# Patient Record
Sex: FEMALE | Race: BLACK OR AFRICAN AMERICAN | NOT HISPANIC OR LATINO
[De-identification: names, ages, dates, MRNs, and addresses within clinical notes are randomized per-mention and may not be internally consistent; named-entity substitution may affect disease eponyms.]

---
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
## 2023-01-07 NOTE — H&P PST PEDIATRIC - HEIGHT/LENGTH IN CM
Received pt on shift pt no s/s of distress pt c/o pain pt states she cant take tylenol and it doesn't work she will throw up tylenol. Pt also stated she does not want to take zofran for nausea pt spitting up clear film like substance pt educated on why she should take zofran pt states she does want zofran of abt. Pt primary notified by perfect serve stated pt cant have narcotics it wont help pt condition. Primary saw pt at bedside pt stated she did not want to eat meals only drank some fluid ate some jello poor appetite. Pt in bed call light in reach.    184 184.6

## 2023-03-29 NOTE — ASU PATIENT PROFILE, PEDIATRIC - FALL HARM RISK TYPE OF ASSESSMENT
Area L Indication Text: Tumors in this location are included in Area L (trunk and extremities).  Mohs surgery is indicated for larger tumors, or tumors with aggressive histologic features, in these anatomic locations. Admission

## 2023-06-22 ENCOUNTER — TRANSCRIPTION ENCOUNTER (OUTPATIENT)
Age: 18
End: 2023-06-22

## 2025-04-07 NOTE — H&P PST PEDIATRIC - NS MD HP ROS SLEEP SOMNOL
PLEASE CLARIFY DIRECTIONS MOUNJARO PEN 2.5MG IS TYPICALLY DOSED INITIALLY AT 2.5MG WEEKLY FOR 4 WEEKS, THEN INCREASED TO 5MG WEEKLY.  OPTUM  
Will hold For PCP to address when he returns.  Joana GARGC    
No